# Patient Record
Sex: FEMALE | Race: OTHER | URBAN - METROPOLITAN AREA
[De-identification: names, ages, dates, MRNs, and addresses within clinical notes are randomized per-mention and may not be internally consistent; named-entity substitution may affect disease eponyms.]

---

## 2023-07-21 ENCOUNTER — CLINICAL DOCUMENTATION (OUTPATIENT)
Age: 28
End: 2023-07-21

## 2023-07-21 NOTE — PROGRESS NOTES
Received referral from [facility] and put in Luzma's box for further review

## 2023-07-27 ENCOUNTER — TELEPHONE (OUTPATIENT)
Age: 28
End: 2023-07-27

## 2023-08-10 ENCOUNTER — CLINICAL DOCUMENTATION (OUTPATIENT)
Age: 28
End: 2023-08-10